# Patient Record
Sex: MALE | Race: OTHER | HISPANIC OR LATINO | URBAN - METROPOLITAN AREA
[De-identification: names, ages, dates, MRNs, and addresses within clinical notes are randomized per-mention and may not be internally consistent; named-entity substitution may affect disease eponyms.]

---
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
## 2022-01-14 LAB — HEMOCCULT STL QL IA: NEGATIVE

## 2022-01-17 ENCOUNTER — EMERGENCY (EMERGENCY)
Facility: HOSPITAL | Age: 22
LOS: 1 days | Discharge: ANOTHER HOSPITAL | End: 2022-01-17
Attending: EMERGENCY MEDICINE | Admitting: EMERGENCY MEDICINE
Payer: COMMERCIAL

## 2022-01-17 VITALS
DIASTOLIC BLOOD PRESSURE: 92 MMHG | HEART RATE: 99 BPM | TEMPERATURE: 98 F | OXYGEN SATURATION: 99 % | SYSTOLIC BLOOD PRESSURE: 132 MMHG | RESPIRATION RATE: 16 BRPM

## 2022-01-17 VITALS
RESPIRATION RATE: 18 BRPM | OXYGEN SATURATION: 98 % | TEMPERATURE: 98 F | HEART RATE: 70 BPM | SYSTOLIC BLOOD PRESSURE: 143 MMHG | DIASTOLIC BLOOD PRESSURE: 65 MMHG

## 2022-01-17 DIAGNOSIS — F32.1 MAJOR DEPRESSIVE DISORDER, SINGLE EPISODE, MODERATE: ICD-10-CM

## 2022-01-17 LAB
ALBUMIN SERPL ELPH-MCNC: 4.7 G/DL — SIGNIFICANT CHANGE UP (ref 3.3–5)
ALP SERPL-CCNC: 76 U/L — SIGNIFICANT CHANGE UP (ref 60–270)
ALT FLD-CCNC: 8 U/L — SIGNIFICANT CHANGE UP (ref 4–41)
ANION GAP SERPL CALC-SCNC: 13 MMOL/L — SIGNIFICANT CHANGE UP (ref 7–14)
APPEARANCE UR: ABNORMAL
AST SERPL-CCNC: 13 U/L — SIGNIFICANT CHANGE UP (ref 4–40)
BILIRUB SERPL-MCNC: 1 MG/DL — SIGNIFICANT CHANGE UP (ref 0.2–1.2)
BILIRUB UR-MCNC: ABNORMAL
BUN SERPL-MCNC: 8 MG/DL — SIGNIFICANT CHANGE UP (ref 7–23)
CALCIUM SERPL-MCNC: 9.8 MG/DL — SIGNIFICANT CHANGE UP (ref 8.4–10.5)
CHLORIDE SERPL-SCNC: 104 MMOL/L — SIGNIFICANT CHANGE UP (ref 98–107)
CO2 SERPL-SCNC: 22 MMOL/L — SIGNIFICANT CHANGE UP (ref 22–31)
COLOR SPEC: YELLOW — SIGNIFICANT CHANGE UP
CREAT SERPL-MCNC: 0.79 MG/DL — SIGNIFICANT CHANGE UP (ref 0.5–1.3)
DIFF PNL FLD: NEGATIVE — SIGNIFICANT CHANGE UP
GLUCOSE SERPL-MCNC: 109 MG/DL — HIGH (ref 70–99)
GLUCOSE UR QL: NEGATIVE — SIGNIFICANT CHANGE UP
HCT VFR BLD CALC: 45.4 % — SIGNIFICANT CHANGE UP (ref 39–50)
HGB BLD-MCNC: 15.2 G/DL — SIGNIFICANT CHANGE UP (ref 13–17)
KETONES UR-MCNC: ABNORMAL
LEUKOCYTE ESTERASE UR-ACNC: NEGATIVE — SIGNIFICANT CHANGE UP
MCHC RBC-ENTMCNC: 29.7 PG — SIGNIFICANT CHANGE UP (ref 27–34)
MCHC RBC-ENTMCNC: 33.5 GM/DL — SIGNIFICANT CHANGE UP (ref 32–36)
MCV RBC AUTO: 88.8 FL — SIGNIFICANT CHANGE UP (ref 80–100)
NITRITE UR-MCNC: NEGATIVE — SIGNIFICANT CHANGE UP
NRBC # BLD: 0 /100 WBCS — SIGNIFICANT CHANGE UP
NRBC # FLD: 0 K/UL — SIGNIFICANT CHANGE UP
PCP SPEC-MCNC: SIGNIFICANT CHANGE UP
PH UR: 6.5 — SIGNIFICANT CHANGE UP (ref 5–8)
PLATELET # BLD AUTO: 178 K/UL — SIGNIFICANT CHANGE UP (ref 150–400)
POTASSIUM SERPL-MCNC: 3.5 MMOL/L — SIGNIFICANT CHANGE UP (ref 3.5–5.3)
POTASSIUM SERPL-SCNC: 3.5 MMOL/L — SIGNIFICANT CHANGE UP (ref 3.5–5.3)
PROT SERPL-MCNC: 7.3 G/DL — SIGNIFICANT CHANGE UP (ref 6–8.3)
PROT UR-MCNC: ABNORMAL
RBC # BLD: 5.11 M/UL — SIGNIFICANT CHANGE UP (ref 4.2–5.8)
RBC # FLD: 12.9 % — SIGNIFICANT CHANGE UP (ref 10.3–14.5)
SARS-COV-2 RNA SPEC QL NAA+PROBE: DETECTED
SODIUM SERPL-SCNC: 139 MMOL/L — SIGNIFICANT CHANGE UP (ref 135–145)
SP GR SPEC: 1.04 — SIGNIFICANT CHANGE UP (ref 1–1.05)
TOXICOLOGY SCREEN, DRUGS OF ABUSE, SERUM RESULT: SIGNIFICANT CHANGE UP
TSH SERPL-MCNC: 0.91 UIU/ML — SIGNIFICANT CHANGE UP (ref 0.5–4.3)
UROBILINOGEN FLD QL: ABNORMAL
WBC # BLD: 6.34 K/UL — SIGNIFICANT CHANGE UP (ref 3.8–10.5)
WBC # FLD AUTO: 6.34 K/UL — SIGNIFICANT CHANGE UP (ref 3.8–10.5)

## 2022-01-17 PROCEDURE — 99285 EMERGENCY DEPT VISIT HI MDM: CPT

## 2022-01-17 PROCEDURE — 99284 EMERGENCY DEPT VISIT MOD MDM: CPT

## 2022-01-17 NOTE — ED ADULT TRIAGE NOTE - CHIEF COMPLAINT QUOTE
Pt AOX4 c/o suicidal ideation increasing over past few days, pt has had episodes of suicidal thinking in the past has not made an attempt, does not have a specific plan, pt calm and cooperative in triage, says his therapist, first name Aliya, called ahead to this ER. Pt describes Hx of anxiety/depression.

## 2022-01-17 NOTE — ED PROVIDER NOTE - CLINICAL SUMMARY MEDICAL DECISION MAKING FREE TEXT BOX
21 yr old M c h/o depression? and suicide attempt in past who p/w SI. No current attempt.  Endorses decreased po intake, insomnia. No sign of acute or decompensated medical illness.  Dispo per  attending.

## 2022-01-17 NOTE — ED PROVIDER NOTE - OBJECTIVE STATEMENT
21 yr old M c h/o depression? seeing a therapist x 1 yr ("doesn't help") p/w worsening depressive symptoms and suicidal ideation.  States he's attempted suicide before (OD). Sleeping 2-3 hrs/night, eating les than usual.  Presents with his father.  Denies any toxic ingestions today. No N/V/D. No fever/chills.  No resp complaints. Per triage note he was sent in by his therapist who reportedly "called in". No VH/AH, no HI.

## 2022-01-17 NOTE — ED ADULT NURSE NOTE - OBJECTIVE STATEMENT
Pt presents to , alert&orientedx4, ambulatory, pmhx anxiety and depression coming to ED for SI, no previous attempts or plan at this time. Has been seeing a therapist for x1yr, states she is not helping. Pt is calm and cooperative, denies any hi, auditory or visual hallucinations. Psych consult at bedside

## 2022-01-17 NOTE — ED BEHAVIORAL HEALTH NOTE - BEHAVIORAL HEALTH NOTE
Pt is pending acceptance to [facility] faxed legal documents and EKG to [facility] at [phone].  Pt pending acceptance to [facility]. Pt is pending acceptance to [facility]-MATILDA faxed legal documents and EKG to [facility] at [phone].  Pt pending acceptance to [facility].    Update-pt accepted to [facility]; accepting MD Dr. Pitts.  MATILDA contacted [facility] at [phone]; spoke with after hours rep. Boo; clinical information provided.  As per Ema, call reference # is [id number].  UR to be contacted on next business day with authorization # for admission to [facility].

## 2022-01-17 NOTE — ED BEHAVIORAL HEALTH ASSESSMENT NOTE - CASE SUMMARY
21M with depression and anxiety presents referred by therapist for worsening symptoms. Patient reports hopelessness, wishes to be dead, insomnia, low mood, anhedonia, occasional active suicidal ideation, He is seeking admission and based on severity of symptoms voluntary admission is appropriate. Admit to [facility] for covid unit. No 1:1 required in locked supervised setting at this time.

## 2022-01-17 NOTE — ED BEHAVIORAL HEALTH ASSESSMENT NOTE - RISK ASSESSMENT
Low Acute Suicide Risk The patient has a low acute risk for suicide. Risk factors include: current depression, h/o NSSIB, prior, h/o substance use, currently not on any anti-depressants, social stressors, age, and gender.  Protective factors include future orientation, identifies reasons for living, support from family, help seeking, attending school, part-time job, and lack of access. Immediate risk may be minimized by inpatient admission to a safe environment with appropriate supervision and limited access to lethal means.

## 2022-01-17 NOTE — ED BEHAVIORAL HEALTH NOTE - BEHAVIORAL HEALTH NOTE
Received call from Ahalogy's therapist Aliya ( [phone]) who completed a remote session with patient and advised him to come to the ED for evaluation. Patient is a 22 y/o single male with h/o major depressive disorder, self harm, and OTC Dextromethorphan abuse. Today patient verbalized worsening mood symptoms and passive suicidal ideation. Patient has not been sleeping and eating one small meal per day. Patient is in current treatment with Dr. Yang and prescribed Seroquel 50 mg qhs. Patient has been psychiatrically hospitalized x 3 ( last 6 months ago at [facility]). Aliya is recommending inpatient hospitalization for stabilization.

## 2022-01-17 NOTE — ED ADULT NURSE NOTE - DOES PATIENT HAVE ADVANCE DIRECTIVE
Erika,     Please review/sign or advise for Prozac refill.    Routing because last PHQ-9 on 02/13/19 was 7.    Grace Cantrell RN  [facility]   unknown

## 2022-01-17 NOTE — ED BEHAVIORAL HEALTH ASSESSMENT NOTE - DESCRIPTION
Pt calm and cooperative. No PRNs used.     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

## 2022-01-17 NOTE — ED BEHAVIORAL HEALTH ASSESSMENT NOTE - SUMMARY
Mr. Josh Diallo is a 22yo single man, domiciled with parents and siblings (21yo, 11yo, 9yo), currently attending [employer] and working PT as  x1yr, with PPHx of MDD and anxiety, no prior inpatient admissions, no prior SAs, with h/o NSSIB (cutting in arms w/ kitchen knives, last cut 2yrs ago; bangs his head against the wall- most recently last week; punches walls- most recently this week), h/o substance use (cannabis and cough medicine), currently in outpt treatment at [facility] (psychiatrist Dr. Crowe; therapist Aliya), denies any h/o aggression or legal issues, denies any significant PMHx, BIB by father to ED upon therapist request d/t worsening depression and SI.    Pt calm and partially cooperative on exam. Notes worsening depressive symptoms including insomnia and SI 2/2 ongoing struggles with family, academic difficulties, and overall hopelessness that “nothing is helping.” Patient with poor appetite/sleep/concentration, anhedonia, depressed mood, and intermittent h/o active SI this past week, but denies any plans/method. Given h/o NSSIB, impulsivity, current passive SI, and severe hopelessness, at this time pt will benefit from inpatient admission for stabilization, medication management, and safety.     Plan:  -admit to inpatient unit, [facility], 9.13 status     -Re-start home meds: Seroquel 50mg qhs    -PRNS: Ativan 1mg PO/IM q6hr, Haldol 5mg PO/IM q6h prn, Benadryl 50mg PO/IM qhr for anxiety/agitation

## 2022-01-17 NOTE — ED BEHAVIORAL HEALTH ASSESSMENT NOTE - HPI (INCLUDE ILLNESS QUALITY, SEVERITY, DURATION, TIMING, CONTEXT, MODIFYING FACTORS, ASSOCIATED SIGNS AND SYMPTOMS)
Mr. Josh Diallo is a 22yo single man, domiciled with parents and siblings (21yo, 13yo, 9yo), currently attending [employer] and working PT as  x1yr, with PPHx of MDD and anxiety, no prior inpatient admissions, no prior SAs, with h/o NSSIB (cutting in arms w/ kitchen knives, last cut 2yrs ago; bangs his head against the wall- most recently last week; punches walls- most recently this week), h/o substance use (cannabis and cough medicine), currently in outpt treatment at [facility] (psychiatrist Dr. Crowe; therapist Aliya), denies any h/o aggression or legal issues, denies any significant PMHx, BIB by father to ED upon therapist request d/t worsening depression and SI.     Patient was born in [place] and is currently living in [place] with his parent and 3 siblings. Notes graduated from  in 2019 and since then has been attending college (on and off) and also working part-time. Patient reports h/o anxiety that started early in  (lots of anxiety in public, couldn’t talk to people, often isolated in his room and did not make many friends). Notes anxiety has increased over the years. Pt reports current worsening anxiety and depression 2/2 ongoing issues with family, academic struggles, and social isolation. Endorses poor energy and appetite, poor sleep (only sleeps about 5hrs/night), anhedonia, impaired concentration, depressed mood, and hopelessness. Endorses intermittent active SI this past week in the context of “nothing is helping… I am not getting better,” but denies any plan or methods. Denies he has ever research suicidal methods. Today, currently endorses passive SI, but denies any intent/plan. Denies any HIIP. Vaguely denies any thoughts/urges to hurt himself. Reports he has been taking Seroquel 50mg for sleep for about a month, with poor efficacy. Unclear as to why he is not any SSRIs/other anti-depressants. Denies any AVH, paranoia, IOR, thought insertion/deletion, or manic symptoms except for “racing thought sometimes.”     Patient endorses h/o cannabis use that started about 1 yr ago, mist recent use yesterday, and usually uses <1g/day about 3x/week. Also endorses h/o cough medicine use. Drinks it to “dissociate,” denies he does for suicidal purposes. Had use it about 10x times, with at least two ED visits as parents become concern when he is acting “out of it” and is sedated.   Patient denies any significant medical issues. Denies any episodes of COVID19 and notes he received Moderna vaccines, 2nd shot on 9/2021. Denies any allergies. Fam hx only + for etoh use in father.     Collateral from therapist Aliya, see separate  note.
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
## 2022-01-18 LAB
COVID-19 SPIKE DOMAIN AB INTERP: POSITIVE
COVID-19 SPIKE DOMAIN ANTIBODY RESULT: >250 U/ML — HIGH
SARS-COV-2 IGG+IGM SERPL QL IA: >250 U/ML — HIGH
SARS-COV-2 IGG+IGM SERPL QL IA: POSITIVE

## 2022-01-19 ENCOUNTER — APPOINTMENT (OUTPATIENT)
Dept: GASTROENTEROLOGY | Facility: CLINIC | Age: 22
End: 2022-01-19

## 2023-09-19 NOTE — ED ADULT NURSE NOTE - NSHOSCREENINGQ1_ED_ALL_ED
Call to patient. Patient denies any complaints related to anticoagulation therapy at this time. Patient reports no change in medication, diet or health. Reinforced with patient to call clinic with any medication changes as this can impact INR. Reinforced signs and symptoms bleeding/clotting with patient. Patient aware to seek medical care if signs and symptoms develop. Advised that if patient falls and/or hits their head, they should seek medical attention. Verbalizes understanding.     Dosing instructions given to patient verbally over the phone. Advised to call the clinic with any questions or concerns. Patient verbalizes understanding. Has clinic number.    Anticoagulation Summary  As of 2023    INR goal:  2.0-3.0   TTR:  54.3 % (12 y)   INR used for dosin.9 (2023)   Warfarin maintenance plan:  7.5 mg (5 mg x 1.5) every day   Weekly warfarin total:  52.5 mg   Plan last modified:  Kalpesh Martins RN (2023)   Next INR check:  10/2/2023   Priority:  Follow-Up - 2 Weeks   Target end date:  Indefinite    Indications    Atrial fibrillation  unspecified type (CMD) (Resolved) [I48.91]  Long term current use of anticoagulant therapy [Z79.01]  Encounter for therapeutic drug monitoring [Z51.81]  Warfarin anticoagulation [Z79.01]  Atrial fibrillation  unspecified type (CMD) [I48.91]             Anticoagulation Episode Summary     INR check location:  Outside Lab    Preferred lab:      Send INR reminders to:  MICHAEL (OPEN ENROLLMENT) [facility] CARD/EP    Comments:  *STAC due 3/8/24; *[facility] 1/10/23; OV due 23; Roche monitor; 5mg tabs; Pt is a retired nurse; ENC 23 - per PATTI Oliver no bridge if INR < 2.0 unless change in med hx      Anticoagulation Care Providers     Provider Role Specialty Phone number    Gagan Davison Referring Internal Medicine [phone]    Gagan Davison MD Responsible Internal Medicine- Interventional Cardiology [phone]          Supervising provider: Cristian  MD Dillan     No

## 2024-12-19 NOTE — ED ADULT NURSE NOTE - NSSEPSISSUSPECTED_ED_A_ED
12/19/24 11:06 AM    Patient contacted post ED visit, [facility] department spoke with patient/caregiver and outreach was successful.    Thank you.  Shannan Angeles  [facility]     No

## 2025-02-12 NOTE — ED BEHAVIORAL HEALTH ASSESSMENT NOTE - NS ED [facility] MED ROS ALLERGIC IMMUNOLOGIC
[place],      Aly
No complaints